# Patient Record
Sex: MALE | HISPANIC OR LATINO
[De-identification: names, ages, dates, MRNs, and addresses within clinical notes are randomized per-mention and may not be internally consistent; named-entity substitution may affect disease eponyms.]

---

## 2020-10-06 PROBLEM — Z00.00 ENCOUNTER FOR PREVENTIVE HEALTH EXAMINATION: Status: ACTIVE | Noted: 2020-10-06

## 2020-10-07 ENCOUNTER — APPOINTMENT (OUTPATIENT)
Dept: PHYSICAL MEDICINE AND REHAB | Facility: CLINIC | Age: 45
End: 2020-10-07
Payer: COMMERCIAL

## 2020-10-07 DIAGNOSIS — Z78.9 OTHER SPECIFIED HEALTH STATUS: ICD-10-CM

## 2020-10-07 DIAGNOSIS — S76.312A STRAIN OF MUSCLE, FASCIA AND TENDON OF THE POSTERIOR MUSCLE GROUP AT THIGH LEVEL, LEFT THIGH, INITIAL ENCOUNTER: ICD-10-CM

## 2020-10-07 DIAGNOSIS — Z72.3 LACK OF PHYSICAL EXERCISE: ICD-10-CM

## 2020-10-07 PROCEDURE — 73562 X-RAY EXAM OF KNEE 3: CPT | Mod: LT

## 2020-10-07 PROCEDURE — 99204 OFFICE O/P NEW MOD 45 MIN: CPT

## 2020-10-07 RX ORDER — MELOXICAM 7.5 MG/1
7.5 TABLET ORAL TWICE DAILY
Qty: 60 | Refills: 0 | Status: ACTIVE | COMMUNITY
Start: 2020-10-07 | End: 1900-01-01

## 2020-10-07 NOTE — PHYSICAL EXAM
[FreeTextEntry1] : 45 year yo male in NAD and obese\par \par \par Gait - normal\par no swelling, erythema, warmth\par flexion to\par no TTP in patellar and quad tendon, medial/lateral joint\par TTP in posterior medial hamstring tendons\par 5/5 knee ext/flex; pain with flex\par neg Lachman, Meeta, valgus/varus laxity\par

## 2020-10-07 NOTE — ASSESSMENT
[FreeTextEntry1] : He does not want PT so gave exercises to do.  Taught wall squat.  Educated to lose weight. Ice area often.  Limit sitting.  \par \par f/u 1 month.

## 2020-10-07 NOTE — DATA REVIEWED
[Plain X-Rays] : plain X-Rays [FreeTextEntry1] : Office xrays on left knee AP lateral and sunrise show no gross abnormalities. There is mild lateral tilting of the patella

## 2020-10-07 NOTE — HISTORY OF PRESENT ILLNESS
[FreeTextEntry1] : Location: left posterior knee\par Quality: pulling\par Severity: 5/10\par Duration: few wks\par Timing: acute\par Context: atraumatic\par Aggravating Factors: walking\par Alleviating Factors: rest\par Associated Symptoms: denies weight loss, fever, chills, redness, warmth, weakness, numbness/tingling, radiation down leg; sometimes goes up left posterior thigh\par

## 2020-11-04 ENCOUNTER — APPOINTMENT (OUTPATIENT)
Dept: PHYSICAL MEDICINE AND REHAB | Facility: CLINIC | Age: 45
End: 2020-11-04